# Patient Record
Sex: MALE | Race: WHITE
[De-identification: names, ages, dates, MRNs, and addresses within clinical notes are randomized per-mention and may not be internally consistent; named-entity substitution may affect disease eponyms.]

---

## 2018-04-07 ENCOUNTER — HOSPITAL ENCOUNTER (EMERGENCY)
Dept: HOSPITAL 62 - ER | Age: 68
Discharge: ANOTHER HOSPITAL | End: 2018-04-07
Payer: MEDICARE

## 2018-04-07 VITALS — SYSTOLIC BLOOD PRESSURE: 130 MMHG | DIASTOLIC BLOOD PRESSURE: 96 MMHG

## 2018-04-07 DIAGNOSIS — I62.9: Primary | ICD-10-CM

## 2018-04-07 DIAGNOSIS — M62.81: ICD-10-CM

## 2018-04-07 LAB
ADD MANUAL DIFF: NO
ANION GAP SERPL CALC-SCNC: 6 MMOL/L (ref 5–19)
APTT BLD: 29 SEC (ref 23.5–35.8)
BASOPHILS # BLD AUTO: 0 10^3/UL (ref 0–0.2)
BASOPHILS NFR BLD AUTO: 0.2 % (ref 0–2)
BUN SERPL-MCNC: 15 MG/DL (ref 7–20)
CALCIUM: 9.5 MG/DL (ref 8.4–10.2)
CHLORIDE SERPL-SCNC: 102 MMOL/L (ref 98–107)
CHOLEST SERPL-MCNC: 147.61 MG/DL (ref 0–200)
CO2 SERPL-SCNC: 31 MMOL/L (ref 22–30)
EOSINOPHIL # BLD AUTO: 0.2 10^3/UL (ref 0–0.6)
EOSINOPHIL NFR BLD AUTO: 1.9 % (ref 0–6)
ERYTHROCYTE [DISTWIDTH] IN BLOOD BY AUTOMATED COUNT: 13.2 % (ref 11.5–14)
GLUCOSE SERPL-MCNC: 120 MG/DL (ref 75–110)
HCT VFR BLD CALC: 43.1 % (ref 37.9–51)
HGB BLD-MCNC: 14.7 G/DL (ref 13.5–17)
INR PPP: 0.98
LDLC SERPL DIRECT ASSAY-MCNC: 74 MG/DL (ref ?–100)
LYMPHOCYTES # BLD AUTO: 1.5 10^3/UL (ref 0.5–4.7)
LYMPHOCYTES NFR BLD AUTO: 18 % (ref 13–45)
MCH RBC QN AUTO: 32 PG (ref 27–33.4)
MCHC RBC AUTO-ENTMCNC: 34.1 G/DL (ref 32–36)
MCV RBC AUTO: 94 FL (ref 80–97)
MONOCYTES # BLD AUTO: 0.7 10^3/UL (ref 0.1–1.4)
MONOCYTES NFR BLD AUTO: 8 % (ref 3–13)
NEUTROPHILS # BLD AUTO: 6 10^3/UL (ref 1.7–8.2)
NEUTS SEG NFR BLD AUTO: 71.9 % (ref 42–78)
PLATELET # BLD: 228 10^3/UL (ref 150–450)
POTASSIUM SERPL-SCNC: 3.9 MMOL/L (ref 3.6–5)
PROTHROMBIN TIME: 13.5 SEC (ref 11.4–15.4)
RBC # BLD AUTO: 4.58 10^6/UL (ref 4.35–5.55)
SODIUM SERPL-SCNC: 138.7 MMOL/L (ref 137–145)
TOTAL CELLS COUNTED % (AUTO): 100 %
TRIGL SERPL-MCNC: 88 MG/DL (ref ?–150)
VLDLC SERPL CALC-MCNC: 18 MG/DL (ref 10–31)
WBC # BLD AUTO: 8.3 10^3/UL (ref 4–10.5)

## 2018-04-07 PROCEDURE — 72158 MRI LUMBAR SPINE W/O & W/DYE: CPT

## 2018-04-07 PROCEDURE — 93005 ELECTROCARDIOGRAM TRACING: CPT

## 2018-04-07 PROCEDURE — 80061 LIPID PANEL: CPT

## 2018-04-07 PROCEDURE — 84484 ASSAY OF TROPONIN QUANT: CPT

## 2018-04-07 PROCEDURE — 85730 THROMBOPLASTIN TIME PARTIAL: CPT

## 2018-04-07 PROCEDURE — 99291 CRITICAL CARE FIRST HOUR: CPT

## 2018-04-07 PROCEDURE — 93010 ELECTROCARDIOGRAM REPORT: CPT

## 2018-04-07 PROCEDURE — 36415 COLL VENOUS BLD VENIPUNCTURE: CPT

## 2018-04-07 PROCEDURE — 73502 X-RAY EXAM HIP UNI 2-3 VIEWS: CPT

## 2018-04-07 PROCEDURE — 70450 CT HEAD/BRAIN W/O DYE: CPT

## 2018-04-07 PROCEDURE — 83036 HEMOGLOBIN GLYCOSYLATED A1C: CPT

## 2018-04-07 PROCEDURE — 85610 PROTHROMBIN TIME: CPT

## 2018-04-07 PROCEDURE — A9577 INJ MULTIHANCE: HCPCS

## 2018-04-07 PROCEDURE — 85025 COMPLETE CBC W/AUTO DIFF WBC: CPT

## 2018-04-07 PROCEDURE — 80048 BASIC METABOLIC PNL TOTAL CA: CPT

## 2018-04-07 NOTE — RADIOLOGY REPORT (SQ)
EXAM DESCRIPTION:  CT HEAD WITHOUT



COMPLETED DATE/TIME:  4/7/2018 9:07 pm



REASON FOR STUDY:  eval stroke, lle weakness



COMPARISON:  12/11/2010



TECHNIQUE:  Axial images acquired through the brain without intravenous contrast.  Images reviewed wi
th bone, brain and subdural windows.  Additional sagittal and coronal reconstructions were generated.
 Images stored on PACS.

All CT scanners at this facility use dose modulation, iterative reconstruction, and/or weight based d
osing when appropriate to reduce radiation dose to as low as reasonably achievable (ALARA).

[facility]: Dose Right  [facility]: CareDose    [facility]: Dose Right    [facility]: Teradose 4D    [facility]: Smart Technologies



RADIATION DOSE:   mGy.



LIMITATIONS:  None.



FINDINGS:  VENTRICLES: Normal size and contour.

CEREBRUM: No masses.  There is a parenchymal hematoma in the medial right frontal lobe adjacent to th
e falx, measuring 1 x 1.5 cm.  There may be a small component in the subarachnoid space.  No midline 
shift.  No evidence for acute infarction. Normal gray/white matter differentiation. No areas of low d
ensity in the white matter.

CEREBELLUM: No masses.  No hemorrhage.  No alteration of density.  No evidence for acute infarction.

EXTRAAXIAL SPACES: No fluid collections.  No masses.

ORBITS AND GLOBE: No intra- or extraconal masses.  Normal contour of globe without masses.

CALVARIUM: No fracture.

PARANASAL SINUSES: No fluid or mucosal thickening.

SOFT TISSUES: No mass or hematoma.

OTHER: No other significant finding.



IMPRESSION:  PARENCHYMAL HEMATOMA IN THE MEDIAL RIGHT FRONTAL LOBE AS DESCRIBED.



COMMENT:   Pertinent findings on the imaging study reported as a CRITICAL RESULT to RICK enamorado
t21:16 on 4/7/2018.

Category of Critical Result: Cerebral bleed.

Quality ID # 436: Final reports with documentation of one or more dose reduction techniques (e.g., Au
tomated exposure control, adjustment of the mA and/or kV according to patient size, use of iterative 
reconstruction technique)



TECHNICAL DOCUMENTATION:  JOB ID:  [id number]

 2011 [employer]- All Rights Reserved



Reading location - IP/workstation name: [email or web address]

## 2018-04-07 NOTE — RADIOLOGY REPORT (SQ)
EXAM DESCRIPTION:  MRI LUMBAR SPINE COMBO



COMPLETED DATE/TIME:  4/7/2018 7:30 pm



REASON FOR STUDY:  LLE flaccid and L hip pain; eval



COMPARISON:  None.



TECHNIQUE:  Sagittal and Axial imaging includes T1, T1 post gadolinium, T2, STIR and gradient echo se
quences. Coronal T2/HASTE imaging.



CONTRAST TYPE AND DOSE:  10 mL Prohance.



RENAL FUNCTION:  GFR > 60.



LIMITATIONS:  None.



FINDINGS:  VISUALIZED UPPER ABDOMEN:  Limited evaluation. No acute or suspicious findings suggested.

SEGMENTATION: No transitional anatomy. The lowest well-developed disc space is labeled L5-S1.

ALIGNMENT: Anatomic.

VERTEBRAE: Intact.  No fractures.

BONE MARROW: Normal. No marrow replacement or reactive changes.

DISC SIGNAL: Mild desiccation of the lower discs, particularly L5-S1.

POSTERIOR ELEMENTS:  Generally intact.  No pars defect evident.

HARDWARE: None in the spine.

CORD AND CONUS: Normal in size and signal intensity. Conus at the appropriate level.

SOFT TISSUES: No aortic aneurysm seen. No bulky retroperitoneal adenopathy or mass. No paraspinal mas
s or fluid.

L1-L2: No significant spinal stenosis or exit foraminal stenosis.

L2-L3: No significant spinal stenosis or exit foraminal stenosis.

L3-L4: No significant disc bulge.  Mild facet arthropathy.  No significant spinal stenosis or exit fo
raminal stenosis.

L4-L5: No significant disc bulge.  Mild to moderate facet arthropathy.  No significant spinal stenosi
s or exit foraminal stenosis.

L5-S1: No significant spinal stenosis or exit foraminal stenosis.

LOWER THORACIC: Incompletely imaged.  No stenosis seen.

SACRUM: Visualized upper sacrum intact.

ENHANCEMENT: No abnormal enhancement.

OTHER: No other significant findings.



IMPRESSION:  FACET ARTHROPATHY.  MILD DISC DESICCATION.  NO SIGNIFICANT DISC BULGE.  NO STENOSIS OR I
MPINGEMENT.



TECHNICAL DOCUMENTATION:  JOB ID:  [id number]

 2011 [employer]- All Rights Reserved



Reading location - IP/workstation name: [email or web address]

## 2018-04-07 NOTE — ER DOCUMENT REPORT
ED General





- General


Chief Complaint: Leg Injury


Stated Complaint: LEG WEAKNESS


Time Seen by Provider: 04/07/18 17:47


Notes: 





Patient is a 67 year old male without past medical history who presents with 

acute onset of left lower extremity weakness at 1530.  Patient states that he 

was walking into the kitchen to begin making dinner when his left leg abruptly 

"gave out on me".  He states that he was able to stabilize himself by grabbing 

onto the countertop but has been unable to use the left lower extremity since 

that time.  He denies anything seems to improve or worsen the symptoms.  He 

denies any history of similar symptoms in the past.  He has not had any upper 

extremity weakness, right lower extremity weakness bowel or bladder incontinence

, urinary retention, and he denies any back or hip pain.  Denies any headache 

or confusion.  He does not take any form of anticoagulation.  He denies any 

head trauma.  He has a remote history of a polypectomy in the lumbar spine 

region for a disc herniation.


TRAVEL OUTSIDE OF THE [place] IN LAST 30 DAYS: No





- Related Data


Allergies/Adverse Reactions: 


 





No Known Allergies Allergy (Unverified 04/07/18 17:20)


 











Past Medical History





- General


Information source: Patient





- Social History


Smoking Status: Never Smoker


Chew tobacco use (# tins/day): Yes


Frequency of alcohol use: None


Drug Abuse: None


Lives with: Spouse/Significant other


Family History: Reviewed & Not Pertinent


Patient has suicidal ideation: No


Patient has homicidal ideation: No


Renal/ Medical History: Denies: Hx Peritoneal Dialysis


Past Surgical History: Reports: Hx Appendectomy





Review of Systems





- Review of Systems


Notes: 





Constitutional: Negative for fever.


HENT: Negative for sore throat.


Eyes: Negative for visual changes.


Cardiovascular: Negative for chest pain.


Respiratory: Negative for shortness of breath.


Gastrointestinal: Negative for abdominal pain, vomiting or diarrhea.


Genitourinary: Negative for dysuria.


Musculoskeletal: Negative for back pain.


Skin: Negative for rash.


Neurological: Negative for headaches, positive for left lower extremity weakness





Physical Exam





- Vital signs


Vitals: 


 











Temp Pulse Resp BP Pulse Ox


 


 98.0 F   75   16   123/69   97 


 


 04/07/18 17:32  04/07/18 17:32  04/07/18 17:32  04/07/18 17:32  04/07/18 17:32











Interpretation: Normal


Notes: 





PHYSICAL EXAMINATION:





GENERAL: Well-appearing, well-nourished and in no acute distress.





HEAD: Atraumatic, normocephalic.





EYES: Pupils equal round and reactive to light, extraocular movements intact, 

sclera anicteric, conjunctiva are normal.





ENT: nares patent, oropharynx clear without exudates.  Moist mucous membranes.





NECK: Normal range of motion, supple without lymphadenopathy





LUNGS: Breath sounds clear to auscultation bilaterally and equal.  No wheezes 

rales or rhonchi.





HEART: Regular rate and rhythm without murmurs





ABDOMEN: Soft, nontender, normoactive bowel sounds.  No guarding, no rebound.  

No masses appreciated.





EXTREMITIES: Normal range of motion, no pitting or edema.  No cyanosis.





NEUROLOGICAL: Face symmetric.  Tongue protrudes midline.  Extraocular motions 

intact.  Pupils are 2 mm and equally reactive.  Normal speech, gait testing 

deferred due to left lower extremity weakness.  5 out of 5 distal and proximal 

strength in the bilateral upper extremity's.  5 out of 5 distal and proximal 

strength in the right lower extremity.  Patient is completely flaccid in the 

left lower extremity with no ability or effort on strength testing.  Sensation 

is grossly intact throughout.  Finger to nose testing normal.  Pronator drift 

normal.





PSYCH: Normal mood, normal affect.





SKIN: Warm, Dry, normal turgor, no rashes or lesions noted.





Course





- Re-evaluation


Re-evalutation: 





04/07/18 20:32


Patient presents with complete flaccidity of the left lower extremity with 

virtually no strength either distally or proximally.  I am very worrisome that 

the patient has had an acute stroke.  He has 2+ patellar and ankle reflexes 

bilaterally.  He has a bounding 2+ DP pulse bilaterally.  Patient was seen in 

triage and was not diagnosed as a possible stroke by the triaging provider.  

Unfortunately on 2 separate occasions I went back to the room to try to 

evaluate the patient is immediately upon picking up his chart but unfortunately 

he was already over an MRI receiving an MRI of the spine.  I was therefore 

unable to examine him.  When the patient returned to the room at approximately 

2000, I went to assess the patient and found his exam to be most consistent 

with an acute stroke.  Patient's symptoms did start at 1530.  He will be moved 

over to the main side, a CT of the head will be obtained, additional stroke 

protocols will be initiated.  He has no additional deficits on examination.  





04/07/18 21:12


CT head does how an acute ICH in the right frontal medial.  No midline shift.  

Patient's vitals remain within normal limits.  He is on cardiac monitor.  He is 

not anticoagulated.  He has not received any blood thinners here in the 

emergency department.  I have contacted [facility] for transfer.  I 

have discussed the results of CT of the head with the radiologist.





04/07/18 21:41


Patient's neurologic exam remains unchanged.  I have informed the patient of 

his acute intracranial bleed.  I have discussed this case with the physician 

assistant under Dr. Vadim Young who has accepted the patient to the intensive 

care unit at [facility].  Transfer pending at this time.





04/07/18 22:48


Patient neurologic exam remains unchanged with complete spasticity to the left 

lower extremity no additional neurologic deficits.  Blood pressure 137/87.  No 

indication for giving nicardipine infusion at this time.  Will continue to 

monitor closely.


04/07/18 23:43


Neuro exam is unchanged.  Patient is stable for transport.





- Vital Signs


Vital signs: 


 











Temp Pulse Resp BP Pulse Ox


 


 98.0 F   58 L  13   130/96 H  97 


 


 04/07/18 17:32  04/07/18 22:18  04/07/18 23:38  04/07/18 23:38  04/07/18 23:38














- Laboratory


Result Diagrams: 


 04/07/18 18:14





 04/07/18 18:14


Laboratory results interpreted by me: 


 











  04/07/18





  18:14


 


Carbon Dioxide  31 H


 


Glucose  120 H














- Diagnostic Test


Radiology reviewed: Image reviewed, Reports reviewed


Radiology results interpreted by me: 





04/07/18 22:49


CT head: Right frontal intraparenchymal bleed





- EKG Interpretation by Me


Additional EKG results interpreted by me: 





04/08/18 01:23


Sinus rhythm.  Rate 60.  No ST elevations or depressions.  QTC is 456.





Critical Care Note





- Critical Care Note


Total time excluding time spent on procedures (mins): 40


Comments: 





Critical care time spent obtaining history from patient or surrogate, 

discussions with consultants, development of treatment plan with patient or 

surrogate, evaluation of patient's response to treatment, examination of patient

, ordering and performing treatments and interventions, ordering and review of 

laboratory studies, re-evaluation of patient's condition, ordering and review 

of radiographic studies and review of old charts





Discharge





- Discharge


Clinical Impression: 


 Acute intracranial hemorrhage, Left leg weakness





Condition: Critical


Disposition: [facility]

## 2018-04-07 NOTE — ER DOCUMENT REPORT
ED Medical Screen (RME)





- General


Chief Complaint: Leg Injury


Stated Complaint: LEG WEAKNESS


Time Seen by Provider: 04/07/18 17:47


Notes: 





RME DISCLOSURE


I have seen this patient as part of a Rapid Medical Evaluation and, if 

applicable, placed any initially appropriate orders. The patient will be seen 

and fully evaluated, including a full history and physical exam, by a provider (

in Main ED or Fast Track) when a room becomes available.





------------------------------------------------------------------





67-year-old male here with complaints of inability to move left leg.  He states 

that he was walking then all of a sudden he felt a discomfort in his left hip 

and his leg suddenly "went out on me".  This occurred approximately 3:30 PM and 

he has been unable to move the leg since then.  He denies any headache vision 

change slurred speech numbness tingling weakness (other than the weakness in 

the left lower extremity but denies numbness and tingling).  No prior history 

of similar but he does have a history of low back surgery in the lumbar spine 

does not know the name of the surgery.  He denies any prior history of atrial 

fibrillation or dissection or aneurysm.  Denies incontinence retention fevers 

chills IV drug use.





EXAM


Ever so slightly cooler left lower extremity versus contralateral


Sensation intact but strength 1/5 LLE





NOTE


Given the history and exam, did not feel code stroke needs activating given 

paresis onset after left hip discomfort


TRAVEL OUTSIDE OF THE [place] IN LAST 30 DAYS: No





- Related Data


Allergies/Adverse Reactions: 


 





No Known Allergies Allergy (Unverified 04/07/18 17:20)


 











Past Medical History





- Social History


Chew tobacco use (# tins/day): Yes


Frequency of alcohol use: None


Drug Abuse: None


Renal/ Medical History: Denies: Hx Peritoneal Dialysis


Past Surgical History: Reports: Hx Appendectomy





Physical Exam





- Vital signs


Vitals: 





 











Temp Pulse Resp BP Pulse Ox


 


 98.0 F   75   16   123/69   97 


 


 04/07/18 17:32  04/07/18 17:32  04/07/18 17:32  04/07/18 17:32  04/07/18 17:32














Course





- Vital Signs


Vital signs: 





 











Temp Pulse Resp BP Pulse Ox


 


 98.0 F   75   16   123/69   97 


 


 04/07/18 17:32  04/07/18 17:32  04/07/18 17:32  04/07/18 17:32  04/07/18 17:32

## 2018-04-07 NOTE — RADIOLOGY REPORT (SQ)
EXAM DESCRIPTION:  HIP LEFT AP/LATERAL



COMPLETED DATE/TIME:  4/7/2018 7:52 pm



REASON FOR STUDY:  left hip pain



COMPARISON:  None.



NUMBER OF VIEWS:  Two views.



TECHNIQUE:  AP pelvis and additional frog-leg view of the left hip.



LIMITATIONS:  None.



FINDINGS:  MINERALIZATION: Normal.

LEFT HIP: No fracture or dislocation.  No worrisome bone lesions.

RIGHT HIP: No fracture or dislocation.  No worrisome bone lesions.

PUBIS AND ISCHIUM: No fracture.

PELVIS: No fracture.

SACRUM: No fracture or dislocation. No worrisome bone lesions.

LOWER LUMBAR SPINE: No fracture or dislocation. No worrisome bone lesions.  No significant disc disea
se.

SOFT TISSUES: No findings.

OTHER: No other significant finding.



IMPRESSION:  NEGATIVE STUDY OF THE LEFT HIP AND PELVIS. NO RADIOGRAPHIC EVIDENCE OF ACUTE INJURY.



TECHNICAL DOCUMENTATION:  JOB ID:  [id number]

 2011 [employer]- All Rights Reserved



Reading location - IP/workstation name: [email or web address]

## 2018-04-08 NOTE — EKG REPORT
SEVERITY:- BORDERLINE ECG -

SINUS RHYTHM

PROBABLE LEFT ATRIAL ABNORMALITY

BORDERLINE INFERIOR Q WAVES

:

Confirmed by: Lukas Rivera 08-Apr-2018 09:56:44

## 2019-06-11 ENCOUNTER — HOSPITAL ENCOUNTER (OUTPATIENT)
Dept: HOSPITAL 62 - SC | Age: 69
Discharge: HOME | End: 2019-06-11
Attending: OPHTHALMOLOGY
Payer: MEDICARE

## 2019-06-11 DIAGNOSIS — I69.354: ICD-10-CM

## 2019-06-11 DIAGNOSIS — H11.132: ICD-10-CM

## 2019-06-11 DIAGNOSIS — I10: ICD-10-CM

## 2019-06-11 DIAGNOSIS — H35.373: ICD-10-CM

## 2019-06-11 DIAGNOSIS — H52.4: ICD-10-CM

## 2019-06-11 DIAGNOSIS — F17.210: ICD-10-CM

## 2019-06-11 DIAGNOSIS — Z79.899: ICD-10-CM

## 2019-06-11 DIAGNOSIS — H25.813: Primary | ICD-10-CM

## 2019-06-11 DIAGNOSIS — H53.2: ICD-10-CM

## 2019-06-11 DIAGNOSIS — J45.909: ICD-10-CM

## 2019-06-11 PROCEDURE — V2632 POST CHMBR INTRAOCULAR LENS: HCPCS

## 2019-06-11 PROCEDURE — C9447 INJ, PHENYLEPHRINE KETOROLAC: HCPCS

## 2019-06-11 PROCEDURE — 66984 XCAPSL CTRC RMVL W/O ECP: CPT

## 2019-06-11 RX ADMIN — BESIFLOXACIN PRN DROP: 6 SUSPENSION OPHTHALMIC at 10:35

## 2019-06-11 RX ADMIN — TROPICAMIDE PRN DROP: 10 SOLUTION/ DROPS OPHTHALMIC at 09:45

## 2019-06-11 RX ADMIN — CYCLOPENTOLATE HYDROCHLORIDE AND PHENYLEPHRINE HYDROCHLORIDE PRN DROP: 2; 10 SOLUTION/ DROPS OPHTHALMIC at 09:54

## 2019-06-11 RX ADMIN — DORZOLAMIDE HYDROCHLORIDE AND TIMOLOL MALEATE PRN DROP: 20; 5 SOLUTION OPHTHALMIC at 00:00

## 2019-06-11 RX ADMIN — CYCLOPENTOLATE HYDROCHLORIDE AND PHENYLEPHRINE HYDROCHLORIDE PRN DROP: 2; 10 SOLUTION/ DROPS OPHTHALMIC at 09:45

## 2019-06-11 RX ADMIN — BESIFLOXACIN PRN DROP: 6 SUSPENSION OPHTHALMIC at 09:31

## 2019-06-11 RX ADMIN — TROPICAMIDE PRN DROP: 10 SOLUTION/ DROPS OPHTHALMIC at 09:54

## 2019-06-11 RX ADMIN — CYCLOPENTOLATE HYDROCHLORIDE AND PHENYLEPHRINE HYDROCHLORIDE PRN DROP: 2; 10 SOLUTION/ DROPS OPHTHALMIC at 09:30

## 2019-06-11 RX ADMIN — DORZOLAMIDE HYDROCHLORIDE AND TIMOLOL MALEATE PRN DROP: 20; 5 SOLUTION OPHTHALMIC at 10:35

## 2019-06-11 RX ADMIN — BESIFLOXACIN PRN DROP: 6 SUSPENSION OPHTHALMIC at 00:00

## 2019-06-11 RX ADMIN — TETRACAINE HYDROCHLORIDE PRN DROP: 25 LIQUID OPHTHALMIC at 09:58

## 2019-06-11 RX ADMIN — BESIFLOXACIN PRN DROP: 6 SUSPENSION OPHTHALMIC at 09:55

## 2019-06-11 RX ADMIN — TETRACAINE HYDROCHLORIDE PRN DROP: 25 LIQUID OPHTHALMIC at 09:32

## 2019-06-11 RX ADMIN — TROPICAMIDE PRN DROP: 10 SOLUTION/ DROPS OPHTHALMIC at 09:30

## 2019-06-11 NOTE — [facility] OPERATIVE REPORT E
[facility] Operative Report



NAME: RUBEN BRAGA

                                      MRN: [id number]

                             AGE: 68Y

DATE OF SURGERY: 06/11/2019                   ROOM:



[facility] Operative Report



PREOPERATIVE DIAGNOSIS:

CATARACT, RIGHT EYE.

POSTOPERATIVE DIAGNOSIS:

CATARACT, RIGHT EYE.

PROCEDURE PERFORMED:

PHACOEMULSIFICATION WITH POSTERIOR CHAMBER INTRAOCULAR LENS, RIGHT EYE.

SURGEON:

SARMAD CAREY MD

ANESTHESIA:

TOPICAL WITH MAC.

INDICATIONS FOR SURGERY:

Difficulty with night driving.

PROCEDURE:

The patient was brought to the Operating Room and placed on the operative

table. Following tetracaine drops, topical anesthesia was administered.

This consisted of instrument wipe pledgets soaked in a solution of 4%

Xylocaine mixed with 0.75% Marcaine in a 1:2 ratio. A 2 x 1 cm pledget was

placed in the superior fornix. A 1 x 1 cm pledget was placed in the

inferior fornix. The eye was patched shut for 5 minutes. The patch was

removed. The eye was sterilely prepped and draped in the usual manner. Lid

speculum was placed in the eye. The pledgets were removed. 4-0 black silk

sutures were placed around the superior and the inferior rectus muscles to

be used as traction. A conjunctival peritomy was made at the 10 o'clock

position. Hemostasis was obtained with bipolar cautery. A posterior limbal

groove was created using a crescent knife and dissected anteriorly towards

the cornea. A sharp point blade was used to create a paracentesis site at

the 2 o'clock position. A 2.4 mm keratome was used to enter the anterior

chamber through the groove. Viscoelastic was injected into the anterior

chamber. An anterior capsulotomy was performed using Utrata forceps in a

capsulorrhexis fashion. Hydrodissection and hydrodelineation were

performed. Phacoemulsification was performed in divide-and-conquer

technique. A total of 8.25 seconds phaco time was used.

Following this, the I/A unit was used to remove residual cortex.

Viscoelastic was injected into the capsular bag. Intraocular lens model

[id number], 19.5 diopters, serial number [id number] was placed in the

capsular bag. The I/A unit was used to remove residual viscoelastic. The

wound was seen to be watertight under high and low pressure, and no sutures

were placed. The intraocular lens was well centered. The pressure was

adjusted in the eye to normal pressure. The 4-0 black silk sutures and lid

speculum were removed. A drop of Cosopt was placed in the eye at the end of

surgery.  The eye was shielded after Besivance drops were placed. The

patient tolerated the procedure well and was sent to the Recovery Room in

good condition.





DICTATING PHYSICIAN: SARMAD CAREY M.D.













DICTATING PHYSICIAN: SARMAD CAREY M.D.



1217M              DT: 06/11/2019 1353

PHY#: [id number]        DD: 06/11/2019 1319

ID:   [id number]               JOB#: [id number]       ACCT: [id number]



cc:SARMAD CAREY M.D.

>

## 2019-07-02 ENCOUNTER — HOSPITAL ENCOUNTER (OUTPATIENT)
Dept: HOSPITAL 62 - SC | Age: 69
Discharge: HOME | End: 2019-07-02
Attending: OPHTHALMOLOGY
Payer: MEDICARE

## 2019-07-02 DIAGNOSIS — I10: ICD-10-CM

## 2019-07-02 DIAGNOSIS — Z96.1: ICD-10-CM

## 2019-07-02 DIAGNOSIS — H25.812: Primary | ICD-10-CM

## 2019-07-02 DIAGNOSIS — I69.849: ICD-10-CM

## 2019-07-02 PROCEDURE — 66984 XCAPSL CTRC RMVL W/O ECP: CPT

## 2019-07-02 PROCEDURE — V2632 POST CHMBR INTRAOCULAR LENS: HCPCS

## 2019-07-02 PROCEDURE — C9447 INJ, PHENYLEPHRINE KETOROLAC: HCPCS

## 2019-07-02 RX ADMIN — DORZOLAMIDE HYDROCHLORIDE AND TIMOLOL MALEATE PRN DROP: 20; 5 SOLUTION OPHTHALMIC at 08:44

## 2019-07-02 RX ADMIN — KETOROLAC TROMETHAMINE PRN DROP: 4.5 SOLUTION/ DROPS OPHTHALMIC at 07:48

## 2019-07-02 RX ADMIN — BESIFLOXACIN PRN DROP: 6 SUSPENSION OPHTHALMIC at 08:07

## 2019-07-02 RX ADMIN — KETOROLAC TROMETHAMINE PRN DROP: 4.5 SOLUTION/ DROPS OPHTHALMIC at 09:25

## 2019-07-02 RX ADMIN — CYCLOPENTOLATE HYDROCHLORIDE AND PHENYLEPHRINE HYDROCHLORIDE PRN DROP: 2; 10 SOLUTION/ DROPS OPHTHALMIC at 08:07

## 2019-07-02 RX ADMIN — TROPICAMIDE PRN DROP: 10 SOLUTION/ DROPS OPHTHALMIC at 07:57

## 2019-07-02 RX ADMIN — BESIFLOXACIN PRN DROP: 6 SUSPENSION OPHTHALMIC at 08:56

## 2019-07-02 RX ADMIN — BESIFLOXACIN PRN DROP: 6 SUSPENSION OPHTHALMIC at 07:47

## 2019-07-02 RX ADMIN — TROPICAMIDE PRN DROP: 10 SOLUTION/ DROPS OPHTHALMIC at 07:47

## 2019-07-02 RX ADMIN — TETRACAINE HYDROCHLORIDE PRN DROP: 25 LIQUID OPHTHALMIC at 07:08

## 2019-07-02 RX ADMIN — CYCLOPENTOLATE HYDROCHLORIDE AND PHENYLEPHRINE HYDROCHLORIDE PRN DROP: 2; 10 SOLUTION/ DROPS OPHTHALMIC at 07:47

## 2019-07-02 RX ADMIN — DORZOLAMIDE HYDROCHLORIDE AND TIMOLOL MALEATE PRN DROP: 20; 5 SOLUTION OPHTHALMIC at 08:56

## 2019-07-02 RX ADMIN — CYCLOPENTOLATE HYDROCHLORIDE AND PHENYLEPHRINE HYDROCHLORIDE PRN DROP: 2; 10 SOLUTION/ DROPS OPHTHALMIC at 07:57

## 2019-07-02 RX ADMIN — BESIFLOXACIN PRN DROP: 6 SUSPENSION OPHTHALMIC at 08:44

## 2019-07-02 RX ADMIN — TETRACAINE HYDROCHLORIDE PRN DROP: 25 LIQUID OPHTHALMIC at 07:48

## 2019-07-02 RX ADMIN — TROPICAMIDE PRN DROP: 10 SOLUTION/ DROPS OPHTHALMIC at 08:07

## 2019-07-02 NOTE — [facility] DISCHARGE SUMMARY E
[facility] Discharge Summary



NAME: RUBEN BRAGA

                                      MRN: [id number]

                                AGE: 68Y

ADMITTED: 07/02/2019           DISCHARGED:



HOSPITAL COURSE:

The patient is a 68-year-old gentleman who underwent uneventful cataract

extraction with intraocular lens implant left eye on 07/02/2019.  He will

be discharged to home.  He is instructed to resume preoperative

medications, take Tylenol as needed for discomfort, to keep his eye

shielded, to use Pred Forte, Prolensa, and Besivance at 3:00 p.m. and 8:00

p.m., to follow up in my office in one day.





DICTATING PHYSICIAN: SARMAD CAREY M.D.



1217M              DT: 07/02/2019 0937

PHY#: [id number]        DD: 07/02/2019 0922

ID:   [id number]               JOB#: [id number]       ACCT: [id number]



cc:SARMAD CAREY M.D.

>

## 2019-07-02 NOTE — [facility]PERAT[facility] REPORT E
[facility]



NAME: RUBEN BRAGA

                                      MRN: [id number]

                             AGE: 68Y

DATE OF SURGERY: 07/02/2019                   ROOM:



[facility] Operative Report



PREOPERATIVE DIAGNOSIS:

CATARACT, LEFT EYE.

POSTOPERATIVE DIAGNOSIS:

CATARACT, LEFT EYE.

PROCEDURE PERFORMED:

PHACOEMULSIFICATION WITH POSTERIOR CHAMBER INTRAOCULAR LENS, LEFT EYE.

SURGEON:

SARMAD CAREY MD

ANESTHESIA:

TOPICAL WITH MAC.

INDICATIONS FOR SURGERY:

Difficulty watching TV clearly and driving at night.

PROCEDURE:

The patient was brought to the Operating Room and placed on the operative

table. Following tetracaine drops, topical anesthesia was administered.

This consisted of instrument wipe pledgets soaked in a solution of 4%

Xylocaine mixed with 0.75% Marcaine in a 1:2 ratio. A 2 x 1 cm pledget was

placed in the superior fornix. A 1 x 1 cm pledget was placed in the

inferior fornix. The eye was patched shut for 5 minutes. The patch was

removed. The eye was sterilely prepped and draped in the usual manner. Lid

speculum was placed in the eye. The pledgets were removed. 4-0 black silk

sutures were placed around the superior and the inferior rectus muscles to

be used as traction. A conjunctival peritomy was made at the 10 o'clock

position. Hemostasis was obtained with bipolar cautery. A posterior limbal

groove was created using a crescent knife and dissected anteriorly towards

the cornea. A sharp point blade was used to create a paracentesis site at

the 2 o'clock position. A 2.4 mm keratome was used to enter the anterior

chamber through the groove. Viscoelastic was injected into the anterior

chamber. An anterior capsulotomy was performed using Utrata forceps in a

capsulorrhexis fashion. Hydrodissection and hydrodelineation were

performed. Phacoemulsification was performed in divide-and-conquer

technique. A total of 36 seconds phaco time was used.

Following this, the I/A unit was used to remove residual cortex.

Viscoelastic was injected into the capsular bag. Intraocular lens model

SN60WF, 19.5 diopters, serial number [id number] was placed in the

capsular bag. The I/A unit was used to remove residual viscoelastic. The

wound was seen to be watertight under high and low pressure, and no sutures

were placed. The intraocular lens was well centered. The pressure was

adjusted in the eye to normal pressure. The 4-0 black silk sutures and lid

speculum were removed. The eye was shielded after Besivance drops were

placed. A drop of Cosopt was placed in the eye at the end of the surgery,

and Omidria was in the irrigation solution.  The patient tolerated the

procedure well and was sent to the Recovery Room in good condition.





DICTATING PHYSICIAN: SARMAD CAREY M.D.













DICTATING PHYSICIAN: SARMAD CAREY M.D.



1217M              DT: 07/02/2019 0934

[facility]#: [id number]        DD: 07/02/2019 0922

ID:   [id number]               JOB#: [id number]       ACCT: [id number]



cc:SARMAD CAREY M.D.

>